# Patient Record
Sex: FEMALE | Race: WHITE | NOT HISPANIC OR LATINO | URBAN - METROPOLITAN AREA
[De-identification: names, ages, dates, MRNs, and addresses within clinical notes are randomized per-mention and may not be internally consistent; named-entity substitution may affect disease eponyms.]

---

## 2020-07-25 ENCOUNTER — TELEPHONE ENCOUNTER (OUTPATIENT)
Dept: URBAN - METROPOLITAN AREA CLINIC 13 | Facility: CLINIC | Age: 35
End: 2020-07-25

## 2020-07-25 RX ORDER — SODIUM SULFATE, POTASSIUM SULFATE, MAGNESIUM SULFATE 17.5; 3.13; 1.6 G/ML; G/ML; G/ML
DILUTE CONTENTS AND USE AS DIRECTED FOR BOWEL PREP SOLUTION, CONCENTRATE ORAL
Qty: 1 | Refills: 0 | OUTPATIENT
Start: 2019-10-23 | End: 2019-11-01

## 2020-07-26 ENCOUNTER — TELEPHONE ENCOUNTER (OUTPATIENT)
Dept: URBAN - METROPOLITAN AREA CLINIC 13 | Facility: CLINIC | Age: 35
End: 2020-07-26

## 2020-07-26 RX ORDER — ESCITALOPRAM 10 MG/1
TABLET, FILM COATED ORAL
Qty: 30 | Refills: 0 | Status: ACTIVE | COMMUNITY
Start: 2019-01-08

## 2020-07-26 RX ORDER — TOPIRAMATE 25 MG/1
TABLET, FILM COATED ORAL
Qty: 60 | Refills: 0 | Status: ACTIVE | COMMUNITY
Start: 2018-07-20

## 2020-07-26 RX ORDER — SUMATRIPTAN SUCCINATE 50 MG/1
TABLET, FILM COATED ORAL
Qty: 9 | Refills: 0 | Status: ACTIVE | COMMUNITY
Start: 2012-10-19

## 2020-07-26 RX ORDER — TOPIRAMATE 25 MG/1
TABLET, FILM COATED ORAL
Qty: 60 | Refills: 0 | Status: ACTIVE | COMMUNITY
Start: 2019-05-01

## 2020-07-26 RX ORDER — ESCITALOPRAM 5 MG/1
TABLET, FILM COATED ORAL
Qty: 30 | Refills: 0 | Status: ACTIVE | COMMUNITY
Start: 2019-01-08

## 2020-07-26 RX ORDER — TOPIRAMATE 100 MG/1
TABLET, FILM COATED ORAL
Qty: 180 | Refills: 0 | Status: ACTIVE | COMMUNITY
Start: 2012-10-12

## 2020-07-26 RX ORDER — TOPIRAMATE 50 MG/1
TAKE 2 TABLETS BY MOUTH TWICE A DAY TABLET, FILM COATED ORAL
Qty: 255 | Refills: 0 | Status: ACTIVE | COMMUNITY
Start: 2011-12-23

## 2020-07-26 RX ORDER — METRONIDAZOLE 500 MG/1
TABLET ORAL
Qty: 2 | Refills: 0 | Status: ACTIVE | COMMUNITY
Start: 2012-10-10

## 2020-07-26 RX ORDER — LEVOFLOXACIN 500 MG/1
TAKE 1 TABLET BY MOUTH EVERY DAY FOR 10 DAYS TABLET, FILM COATED ORAL
Qty: 10 | Refills: 0 | Status: ACTIVE | COMMUNITY
Start: 2012-03-20

## 2020-07-26 RX ORDER — ERYTHROMYCIN 500 MG/1
TABLET, COATED ORAL
Qty: 2 | Refills: 0 | Status: ACTIVE | COMMUNITY
Start: 2012-10-10

## 2020-07-26 RX ORDER — DULOXETINE HCL 20 MG
TAKE 1 CAP DAILY X7 DAYS, 2 CAPS DAILY X7 DAYS THEN 3 CAPS DAILY. THEN CAPSULE,DELAYED RELEASE (ENTERIC COATED) ORAL
Qty: 30 | Refills: 0 | Status: ACTIVE | COMMUNITY
Start: 2012-07-19

## 2020-07-26 RX ORDER — NORETHINDRONE ACETATE AND ETHINYL ESTRADIOL, AND FERROUS FUMARATE 1.5-30(21)
KIT ORAL
Qty: 28 | Refills: 0 | Status: ACTIVE | COMMUNITY
Start: 2012-06-29

## 2020-07-26 RX ORDER — CEPHALEXIN 500 MG/1
CAPSULE ORAL
Qty: 40 | Refills: 0 | Status: ACTIVE | COMMUNITY
Start: 2012-09-20

## 2020-07-26 RX ORDER — VERAPAMIL HYDROCHLORIDE 120 MG/1
TAKE 1 TABLET BY MOUTH EVERY DAY WITH FOOD TABLET, FILM COATED, EXTENDED RELEASE ORAL
Qty: 30 | Refills: 0 | Status: ACTIVE | COMMUNITY
Start: 2012-03-26

## 2020-07-26 RX ORDER — DICYCLOMINE HYDROCHLORIDE 10 MG/1
TAKE 1 CAPSULE 3 TIMES DAILY PRN CAPSULE ORAL
Qty: 40 | Refills: 1 | Status: ACTIVE | COMMUNITY
Start: 2019-11-12

## 2020-07-26 RX ORDER — ACETAMINOPHEN AND CODEINE PHOSPHATE 300; 30 MG/1; MG/1
TABLET ORAL
Qty: 30 | Refills: 0 | Status: ACTIVE | COMMUNITY
Start: 2012-10-12

## 2020-07-26 RX ORDER — BUPROPION HYDROCHLORIDE 100 MG/1
TAKE 1 TABLET TWICE DAILY TABLET, FILM COATED ORAL
Refills: 0 | Status: ACTIVE | COMMUNITY

## 2020-07-26 RX ORDER — BUPROPION HYDROCHLORIDE 100 MG/1
TABLET, EXTENDED RELEASE ORAL
Qty: 180 | Refills: 0 | Status: ACTIVE | COMMUNITY
Start: 2019-05-15

## 2021-03-09 ENCOUNTER — WEB ENCOUNTER (OUTPATIENT)
Dept: URBAN - METROPOLITAN AREA CLINIC 113 | Facility: CLINIC | Age: 36
End: 2021-03-09

## 2021-03-09 ENCOUNTER — OFFICE VISIT (OUTPATIENT)
Dept: URBAN - METROPOLITAN AREA CLINIC 113 | Facility: CLINIC | Age: 36
End: 2021-03-09
Payer: COMMERCIAL

## 2021-03-09 VITALS
RESPIRATION RATE: 18 BRPM | SYSTOLIC BLOOD PRESSURE: 126 MMHG | WEIGHT: 136 LBS | DIASTOLIC BLOOD PRESSURE: 72 MMHG | TEMPERATURE: 97.6 F | HEIGHT: 63 IN | BODY MASS INDEX: 24.1 KG/M2 | HEART RATE: 85 BPM

## 2021-03-09 DIAGNOSIS — Z86.010 HISTORY OF COLON POLYPS: ICD-10-CM

## 2021-03-09 DIAGNOSIS — K59.01 SLOW TRANSIT CONSTIPATION: ICD-10-CM

## 2021-03-09 DIAGNOSIS — R10.9 LEFT SIDED ABDOMINAL PAIN: ICD-10-CM

## 2021-03-09 PROCEDURE — 99213 OFFICE O/P EST LOW 20 MIN: CPT | Performed by: NURSE PRACTITIONER

## 2021-03-09 RX ORDER — SUMATRIPTAN SUCCINATE 50 MG/1
TABLET, FILM COATED ORAL
Qty: 9 | Refills: 0 | Status: ON HOLD | COMMUNITY
Start: 2012-10-19

## 2021-03-09 RX ORDER — CEPHALEXIN 500 MG/1
CAPSULE ORAL
Qty: 40 | Refills: 0 | Status: ON HOLD | COMMUNITY
Start: 2012-09-20

## 2021-03-09 RX ORDER — TOPIRAMATE 25 MG/1
TABLET, FILM COATED ORAL
Qty: 60 | Refills: 0 | Status: ON HOLD | COMMUNITY
Start: 2018-07-20

## 2021-03-09 RX ORDER — LEVOFLOXACIN 500 MG/1
TAKE 1 TABLET BY MOUTH EVERY DAY FOR 10 DAYS TABLET, FILM COATED ORAL
Qty: 10 | Refills: 0 | Status: ON HOLD | COMMUNITY
Start: 2012-03-20

## 2021-03-09 RX ORDER — BUPROPION HYDROCHLORIDE 100 MG/1
TABLET, EXTENDED RELEASE ORAL
Qty: 180 | Refills: 0 | Status: ON HOLD | COMMUNITY
Start: 2019-05-15

## 2021-03-09 RX ORDER — BUPROPION HYDROCHLORIDE 100 MG/1
TAKE 1 TABLET TWICE DAILY TABLET, FILM COATED ORAL
Refills: 0 | Status: ON HOLD | COMMUNITY

## 2021-03-09 RX ORDER — ESCITALOPRAM 10 MG/1
TABLET, FILM COATED ORAL
Qty: 30 | Refills: 0 | Status: ON HOLD | COMMUNITY
Start: 2019-01-08

## 2021-03-09 RX ORDER — DULOXETINE HCL 20 MG
TAKE 1 CAP DAILY X7 DAYS, 2 CAPS DAILY X7 DAYS THEN 3 CAPS DAILY. THEN CAPSULE,DELAYED RELEASE (ENTERIC COATED) ORAL
Qty: 30 | Refills: 0 | Status: ON HOLD | COMMUNITY
Start: 2012-07-19

## 2021-03-09 RX ORDER — TOPIRAMATE 100 MG/1
TABLET, FILM COATED ORAL
Qty: 180 | Refills: 0 | Status: ON HOLD | COMMUNITY
Start: 2012-10-12

## 2021-03-09 RX ORDER — TOPIRAMATE 50 MG/1
TAKE 2 TABLETS BY MOUTH TWICE A DAY TABLET, FILM COATED ORAL
Qty: 255 | Refills: 0 | Status: ON HOLD | COMMUNITY
Start: 2011-12-23

## 2021-03-09 RX ORDER — VERAPAMIL HYDROCHLORIDE 120 MG/1
TAKE 1 TABLET BY MOUTH EVERY DAY WITH FOOD TABLET, FILM COATED, EXTENDED RELEASE ORAL
Qty: 30 | Refills: 0 | Status: ON HOLD | COMMUNITY
Start: 2012-03-26

## 2021-03-09 RX ORDER — METRONIDAZOLE 500 MG/1
TABLET ORAL
Qty: 2 | Refills: 0 | Status: ON HOLD | COMMUNITY
Start: 2012-10-10

## 2021-03-09 RX ORDER — ERYTHROMYCIN 500 MG/1
TABLET, COATED ORAL
Qty: 2 | Refills: 0 | Status: ON HOLD | COMMUNITY
Start: 2012-10-10

## 2021-03-09 RX ORDER — ESCITALOPRAM 5 MG/1
TABLET, FILM COATED ORAL
Qty: 30 | Refills: 0 | Status: ON HOLD | COMMUNITY
Start: 2019-01-08

## 2021-03-09 RX ORDER — SODIUM, POTASSIUM,MAG SULFATES 17.5-3.13G
354 ML SOLUTION, RECONSTITUTED, ORAL ORAL ONCE
Qty: 354 MILLILITER | Refills: 0 | OUTPATIENT
Start: 2021-03-09 | End: 2021-03-10

## 2021-03-09 RX ORDER — ACETAMINOPHEN AND CODEINE PHOSPHATE 300; 30 MG/1; MG/1
TABLET ORAL
Qty: 30 | Refills: 0 | Status: ON HOLD | COMMUNITY
Start: 2012-10-12

## 2021-03-09 RX ORDER — LINACLOTIDE 72 UG/1
1 CAPSULE AT LEAST 30 MINUTES BEFORE THE FIRST MEAL OF THE DAY ON AN EMPTY STOMACH CAPSULE, GELATIN COATED ORAL ONCE A DAY
Qty: 90 CAPSULES | Refills: 3 | OUTPATIENT
Start: 2021-03-09 | End: 2022-03-04

## 2021-03-09 RX ORDER — DICYCLOMINE HYDROCHLORIDE 10 MG/1
TAKE 1 CAPSULE 3 TIMES DAILY PRN CAPSULE ORAL
Qty: 40 | Refills: 1 | Status: ON HOLD | COMMUNITY
Start: 2019-11-12

## 2021-03-09 RX ORDER — NORETHINDRONE ACETATE AND ETHINYL ESTRADIOL, AND FERROUS FUMARATE 1.5-30(21)
KIT ORAL
Qty: 28 | Refills: 0 | Status: ON HOLD | COMMUNITY
Start: 2012-06-29

## 2021-03-09 RX ORDER — HYOSCYAMINE SULFATE 0.12 MG/1
PLACE 1 TABLET UNDER THE TONGUE AND ALLOW TO DISSOLVE AS NEEDED FOR ABDOMINAL PAIN TABLET SUBLINGUAL
Qty: 45 | Refills: 1 | OUTPATIENT
Start: 2021-03-09 | End: 2021-03-29

## 2021-03-09 NOTE — HPI-TODAY'S VISIT:
34yo female with a history of colon polyps presenting for evaluation of left sided abodminal pain and a change in bowel habits.   She was last seen 11/12/19 for follow-up after colonoscopy, which was notable for the piecemeal removal of a 25mm sessile serrated polyp in the ascending colon. She was recommended a daily bowel regimen with MiraLAX and milk of magnesia for constipation, with plan for repeat colonoscopy in 6 months for surveillance.  She was unable to proceed with repeat colonoscopy due to financial barriers and the COVID-19 pandemic. She returns today with complaint of episodic left sided abdominal pain, bloating, constipation and stool urgency. She states she will go 3-4 days without a bowel movement, followed by "pebble-like" or soft stools. She complains of the frequent urge to defecate, without response. She has tried MiraLAX, without relief. She has occasional small volume red blood per rectum with wiping, which she attributes to hemorrhoids. She complains of intermittent left sided abdominal pain, which subsides with a bowel movement. Dicyclomine has not been helpful.

## 2021-04-06 ENCOUNTER — OFFICE VISIT (OUTPATIENT)
Dept: URBAN - METROPOLITAN AREA SURGERY CENTER 25 | Facility: SURGERY CENTER | Age: 36
End: 2021-04-06
Payer: COMMERCIAL

## 2021-04-06 DIAGNOSIS — Z86.010 H/O ADENOMATOUS POLYP OF COLON: ICD-10-CM

## 2021-04-06 PROCEDURE — G0105 COLORECTAL SCRN; HI RISK IND: HCPCS | Performed by: INTERNAL MEDICINE

## 2021-04-06 PROCEDURE — G8907 PT DOC NO EVENTS ON DISCHARG: HCPCS | Performed by: INTERNAL MEDICINE

## 2021-04-06 RX ORDER — DULOXETINE HCL 20 MG
TAKE 1 CAP DAILY X7 DAYS, 2 CAPS DAILY X7 DAYS THEN 3 CAPS DAILY. THEN CAPSULE,DELAYED RELEASE (ENTERIC COATED) ORAL
Qty: 30 | Refills: 0 | Status: ON HOLD | COMMUNITY
Start: 2012-07-19

## 2021-04-06 RX ORDER — SUMATRIPTAN SUCCINATE 50 MG/1
TABLET, FILM COATED ORAL
Qty: 9 | Refills: 0 | Status: ON HOLD | COMMUNITY
Start: 2012-10-19

## 2021-04-06 RX ORDER — BUPROPION HYDROCHLORIDE 100 MG/1
TAKE 1 TABLET TWICE DAILY TABLET, FILM COATED ORAL
Refills: 0 | Status: ON HOLD | COMMUNITY

## 2021-04-06 RX ORDER — ESCITALOPRAM 5 MG/1
TABLET, FILM COATED ORAL
Qty: 30 | Refills: 0 | Status: ON HOLD | COMMUNITY
Start: 2019-01-08

## 2021-04-06 RX ORDER — ERYTHROMYCIN 500 MG/1
TABLET, COATED ORAL
Qty: 2 | Refills: 0 | Status: ON HOLD | COMMUNITY
Start: 2012-10-10

## 2021-04-06 RX ORDER — LEVOFLOXACIN 500 MG/1
TAKE 1 TABLET BY MOUTH EVERY DAY FOR 10 DAYS TABLET, FILM COATED ORAL
Qty: 10 | Refills: 0 | Status: ON HOLD | COMMUNITY
Start: 2012-03-20

## 2021-04-06 RX ORDER — TOPIRAMATE 25 MG/1
TABLET, FILM COATED ORAL
Qty: 60 | Refills: 0 | Status: ON HOLD | COMMUNITY
Start: 2018-07-20

## 2021-04-06 RX ORDER — VERAPAMIL HYDROCHLORIDE 120 MG/1
TAKE 1 TABLET BY MOUTH EVERY DAY WITH FOOD TABLET, FILM COATED, EXTENDED RELEASE ORAL
Qty: 30 | Refills: 0 | Status: ON HOLD | COMMUNITY
Start: 2012-03-26

## 2021-04-06 RX ORDER — ACETAMINOPHEN AND CODEINE PHOSPHATE 300; 30 MG/1; MG/1
TABLET ORAL
Qty: 30 | Refills: 0 | Status: ON HOLD | COMMUNITY
Start: 2012-10-12

## 2021-04-06 RX ORDER — BUPROPION HYDROCHLORIDE 100 MG/1
TABLET, EXTENDED RELEASE ORAL
Qty: 180 | Refills: 0 | Status: ON HOLD | COMMUNITY
Start: 2019-05-15

## 2021-04-06 RX ORDER — TOPIRAMATE 100 MG/1
TABLET, FILM COATED ORAL
Qty: 180 | Refills: 0 | Status: ON HOLD | COMMUNITY
Start: 2012-10-12

## 2021-04-06 RX ORDER — CEPHALEXIN 500 MG/1
CAPSULE ORAL
Qty: 40 | Refills: 0 | Status: ON HOLD | COMMUNITY
Start: 2012-09-20

## 2021-04-06 RX ORDER — METRONIDAZOLE 500 MG/1
TABLET ORAL
Qty: 2 | Refills: 0 | Status: ON HOLD | COMMUNITY
Start: 2012-10-10

## 2021-04-06 RX ORDER — DICYCLOMINE HYDROCHLORIDE 10 MG/1
TAKE 1 CAPSULE 3 TIMES DAILY PRN CAPSULE ORAL
Qty: 40 | Refills: 1 | Status: ON HOLD | COMMUNITY
Start: 2019-11-12

## 2021-04-06 RX ORDER — ESCITALOPRAM 10 MG/1
TABLET, FILM COATED ORAL
Qty: 30 | Refills: 0 | Status: ON HOLD | COMMUNITY
Start: 2019-01-08

## 2021-04-06 RX ORDER — LINACLOTIDE 72 UG/1
1 CAPSULE AT LEAST 30 MINUTES BEFORE THE FIRST MEAL OF THE DAY ON AN EMPTY STOMACH CAPSULE, GELATIN COATED ORAL ONCE A DAY
Qty: 90 CAPSULES | Refills: 3 | Status: ACTIVE | COMMUNITY
Start: 2021-03-09 | End: 2022-03-04

## 2021-04-06 RX ORDER — NORETHINDRONE ACETATE AND ETHINYL ESTRADIOL, AND FERROUS FUMARATE 1.5-30(21)
KIT ORAL
Qty: 28 | Refills: 0 | Status: ON HOLD | COMMUNITY
Start: 2012-06-29

## 2021-04-06 RX ORDER — TOPIRAMATE 50 MG/1
TAKE 2 TABLETS BY MOUTH TWICE A DAY TABLET, FILM COATED ORAL
Qty: 255 | Refills: 0 | Status: ON HOLD | COMMUNITY
Start: 2011-12-23

## 2021-05-04 ENCOUNTER — OFFICE VISIT (OUTPATIENT)
Dept: URBAN - METROPOLITAN AREA CLINIC 113 | Facility: CLINIC | Age: 36
End: 2021-05-04

## 2021-06-29 ENCOUNTER — DASHBOARD ENCOUNTERS (OUTPATIENT)
Age: 36
End: 2021-06-29

## 2021-06-29 ENCOUNTER — WEB ENCOUNTER (OUTPATIENT)
Dept: URBAN - METROPOLITAN AREA CLINIC 113 | Facility: CLINIC | Age: 36
End: 2021-06-29

## 2021-06-29 ENCOUNTER — OFFICE VISIT (OUTPATIENT)
Dept: URBAN - METROPOLITAN AREA CLINIC 113 | Facility: CLINIC | Age: 36
End: 2021-06-29
Payer: COMMERCIAL

## 2021-06-29 ENCOUNTER — LAB OUTSIDE AN ENCOUNTER (OUTPATIENT)
Dept: URBAN - METROPOLITAN AREA CLINIC 113 | Facility: CLINIC | Age: 36
End: 2021-06-29

## 2021-06-29 VITALS
WEIGHT: 139 LBS | DIASTOLIC BLOOD PRESSURE: 75 MMHG | BODY MASS INDEX: 24.63 KG/M2 | TEMPERATURE: 98.2 F | RESPIRATION RATE: 18 BRPM | HEIGHT: 63 IN | SYSTOLIC BLOOD PRESSURE: 118 MMHG | HEART RATE: 80 BPM

## 2021-06-29 DIAGNOSIS — Z86.010 HISTORY OF COLON POLYPS: ICD-10-CM

## 2021-06-29 DIAGNOSIS — R13.10 ESOPHAGEAL DYSPHAGIA: ICD-10-CM

## 2021-06-29 DIAGNOSIS — R10.9 LEFT SIDED ABDOMINAL PAIN: ICD-10-CM

## 2021-06-29 DIAGNOSIS — N80.9 ENDOMETRIOSIS: ICD-10-CM

## 2021-06-29 DIAGNOSIS — K59.01 SLOW TRANSIT CONSTIPATION: ICD-10-CM

## 2021-06-29 PROBLEM — 129103003: Status: ACTIVE | Noted: 2021-06-29

## 2021-06-29 PROBLEM — 428283002 HISTORY OF POLYP OF COLON: Status: ACTIVE | Noted: 2021-03-09

## 2021-06-29 PROCEDURE — 99214 OFFICE O/P EST MOD 30 MIN: CPT | Performed by: INTERNAL MEDICINE

## 2021-06-29 RX ORDER — NORETHINDRONE ACETATE AND ETHINYL ESTRADIOL, AND FERROUS FUMARATE 1.5-30(21)
KIT ORAL
Qty: 28 | Refills: 0 | Status: ON HOLD | COMMUNITY
Start: 2012-06-29

## 2021-06-29 RX ORDER — ERYTHROMYCIN 500 MG/1
TABLET, COATED ORAL
Qty: 2 | Refills: 0 | Status: ON HOLD | COMMUNITY
Start: 2012-10-10

## 2021-06-29 RX ORDER — BUPROPION HYDROCHLORIDE 100 MG/1
TAKE 1 TABLET TWICE DAILY TABLET, FILM COATED ORAL
Refills: 0 | Status: ON HOLD | COMMUNITY

## 2021-06-29 RX ORDER — ESCITALOPRAM 5 MG/1
TABLET, FILM COATED ORAL
Qty: 30 | Refills: 0 | Status: ON HOLD | COMMUNITY
Start: 2019-01-08

## 2021-06-29 RX ORDER — TOPIRAMATE 25 MG/1
TABLET, FILM COATED ORAL
Qty: 60 | Refills: 0 | Status: ON HOLD | COMMUNITY
Start: 2018-07-20

## 2021-06-29 RX ORDER — LINACLOTIDE 72 UG/1
1 CAPSULE AT LEAST 30 MINUTES BEFORE THE FIRST MEAL OF THE DAY ON AN EMPTY STOMACH CAPSULE, GELATIN COATED ORAL ONCE A DAY
Qty: 90 CAPSULES | Refills: 3 | Status: ACTIVE | COMMUNITY
Start: 2021-03-09 | End: 2022-03-04

## 2021-06-29 RX ORDER — VERAPAMIL HYDROCHLORIDE 120 MG/1
TAKE 1 TABLET BY MOUTH EVERY DAY WITH FOOD TABLET, FILM COATED, EXTENDED RELEASE ORAL
Qty: 30 | Refills: 0 | Status: ON HOLD | COMMUNITY
Start: 2012-03-26

## 2021-06-29 RX ORDER — DULOXETINE HCL 20 MG
TAKE 1 CAP DAILY X7 DAYS, 2 CAPS DAILY X7 DAYS THEN 3 CAPS DAILY. THEN CAPSULE,DELAYED RELEASE (ENTERIC COATED) ORAL
Qty: 30 | Refills: 0 | Status: ON HOLD | COMMUNITY
Start: 2012-07-19

## 2021-06-29 RX ORDER — METRONIDAZOLE 500 MG/1
TABLET ORAL
Qty: 2 | Refills: 0 | Status: ON HOLD | COMMUNITY
Start: 2012-10-10

## 2021-06-29 RX ORDER — ACETAMINOPHEN AND CODEINE PHOSPHATE 300; 30 MG/1; MG/1
TABLET ORAL
Qty: 30 | Refills: 0 | Status: ON HOLD | COMMUNITY
Start: 2012-10-12

## 2021-06-29 RX ORDER — TOPIRAMATE 50 MG/1
TAKE 2 TABLETS BY MOUTH TWICE A DAY TABLET, FILM COATED ORAL
Qty: 255 | Refills: 0 | Status: ON HOLD | COMMUNITY
Start: 2011-12-23

## 2021-06-29 RX ORDER — ELAGOLIX 150 MG/1
1 TABLET TABLET, FILM COATED ORAL ONCE A DAY
Status: ACTIVE | COMMUNITY

## 2021-06-29 RX ORDER — CEPHALEXIN 500 MG/1
CAPSULE ORAL
Qty: 40 | Refills: 0 | Status: ON HOLD | COMMUNITY
Start: 2012-09-20

## 2021-06-29 RX ORDER — BUPROPION HYDROCHLORIDE 100 MG/1
TABLET, EXTENDED RELEASE ORAL
Qty: 180 | Refills: 0 | Status: ON HOLD | COMMUNITY
Start: 2019-05-15

## 2021-06-29 RX ORDER — ESCITALOPRAM 10 MG/1
TABLET, FILM COATED ORAL
Qty: 30 | Refills: 0 | Status: ON HOLD | COMMUNITY
Start: 2019-01-08

## 2021-06-29 RX ORDER — TOPIRAMATE 100 MG/1
TABLET, FILM COATED ORAL
Qty: 180 | Refills: 0 | Status: ON HOLD | COMMUNITY
Start: 2012-10-12

## 2021-06-29 RX ORDER — DICYCLOMINE HYDROCHLORIDE 10 MG/1
TAKE 1 CAPSULE 3 TIMES DAILY PRN CAPSULE ORAL
Qty: 40 | Refills: 1 | Status: ON HOLD | COMMUNITY
Start: 2019-11-12

## 2021-06-29 RX ORDER — SUMATRIPTAN SUCCINATE 50 MG/1
TABLET, FILM COATED ORAL
Qty: 9 | Refills: 0 | Status: ON HOLD | COMMUNITY
Start: 2012-10-19

## 2021-06-29 RX ORDER — LEVOFLOXACIN 500 MG/1
TAKE 1 TABLET BY MOUTH EVERY DAY FOR 10 DAYS TABLET, FILM COATED ORAL
Qty: 10 | Refills: 0 | Status: ON HOLD | COMMUNITY
Start: 2012-03-20

## 2021-06-29 NOTE — HPI-TODAY'S VISIT:
Patient returns today in follow-up and she has a history of slow transit constipation and some abdominal pain as well as endometriosis.  We gave her a trial of Levsin and Linzess.  She reports that Linzess has been very effective.  She is having regular bowel movements now.  She is quite pleased with the effect of the medication.  She denies any bleeding.  Levsin was ineffective.  Based on this she did follow through with OB/GYN and had a laparoscopy with endometrial ablation.  Apparently there was fairly extensive endometrial deposits.  She is now planning on hysterectomy.  Her colonoscopy was performed in April given a history of polyps.  Tattoo site looks good.  A 5-year interval was recommended and discussed with the patient today.  She does have a new complaint of swallowing difficulty.  This is been going on for several months.  She does not endorse any reflux.  She tells me she has had reflux in the past particularly when she was pregnant but this does not feel like that.  There is no precipitating factor.  No ameliorating factor.  Can occur with solids and liquids although does seem to be more associated with solids.

## 2021-07-02 PROBLEM — 35298007 SLOW TRANSIT CONSTIPATION: Status: ACTIVE | Noted: 2021-03-09

## 2021-07-02 PROBLEM — 40890009: Status: ACTIVE | Noted: 2021-06-29

## 2021-07-22 ENCOUNTER — OFFICE VISIT (OUTPATIENT)
Dept: URBAN - METROPOLITAN AREA SURGERY CENTER 25 | Facility: SURGERY CENTER | Age: 36
End: 2021-07-22
Payer: COMMERCIAL

## 2021-07-22 DIAGNOSIS — R13.10 DYSPHAGIA: ICD-10-CM

## 2021-07-22 PROCEDURE — G8907 PT DOC NO EVENTS ON DISCHARG: HCPCS | Performed by: INTERNAL MEDICINE

## 2021-07-22 PROCEDURE — 43235 EGD DIAGNOSTIC BRUSH WASH: CPT | Performed by: INTERNAL MEDICINE

## 2021-07-22 RX ORDER — NORETHINDRONE ACETATE AND ETHINYL ESTRADIOL, AND FERROUS FUMARATE 1.5-30(21)
KIT ORAL
Qty: 28 | Refills: 0 | Status: ON HOLD | COMMUNITY
Start: 2012-06-29

## 2021-07-22 RX ORDER — CEPHALEXIN 500 MG/1
CAPSULE ORAL
Qty: 40 | Refills: 0 | Status: ON HOLD | COMMUNITY
Start: 2012-09-20

## 2021-07-22 RX ORDER — ACETAMINOPHEN AND CODEINE PHOSPHATE 300; 30 MG/1; MG/1
TABLET ORAL
Qty: 30 | Refills: 0 | Status: ON HOLD | COMMUNITY
Start: 2012-10-12

## 2021-07-22 RX ORDER — DULOXETINE HCL 20 MG
TAKE 1 CAP DAILY X7 DAYS, 2 CAPS DAILY X7 DAYS THEN 3 CAPS DAILY. THEN CAPSULE,DELAYED RELEASE (ENTERIC COATED) ORAL
Qty: 30 | Refills: 0 | Status: ON HOLD | COMMUNITY
Start: 2012-07-19

## 2021-07-22 RX ORDER — SUMATRIPTAN SUCCINATE 50 MG/1
TABLET, FILM COATED ORAL
Qty: 9 | Refills: 0 | Status: ON HOLD | COMMUNITY
Start: 2012-10-19

## 2021-07-22 RX ORDER — ELAGOLIX 150 MG/1
1 TABLET TABLET, FILM COATED ORAL ONCE A DAY
Status: ACTIVE | COMMUNITY

## 2021-07-22 RX ORDER — LINACLOTIDE 72 UG/1
1 CAPSULE AT LEAST 30 MINUTES BEFORE THE FIRST MEAL OF THE DAY ON AN EMPTY STOMACH CAPSULE, GELATIN COATED ORAL ONCE A DAY
Qty: 90 CAPSULES | Refills: 3 | Status: ACTIVE | COMMUNITY
Start: 2021-03-09 | End: 2022-03-04

## 2021-07-22 RX ORDER — DICYCLOMINE HYDROCHLORIDE 10 MG/1
TAKE 1 CAPSULE 3 TIMES DAILY PRN CAPSULE ORAL
Qty: 40 | Refills: 1 | Status: ON HOLD | COMMUNITY
Start: 2019-11-12

## 2021-07-22 RX ORDER — BUPROPION HYDROCHLORIDE 100 MG/1
TAKE 1 TABLET TWICE DAILY TABLET, FILM COATED ORAL
Refills: 0 | Status: ON HOLD | COMMUNITY

## 2021-07-22 RX ORDER — BUPROPION HYDROCHLORIDE 100 MG/1
TABLET, EXTENDED RELEASE ORAL
Qty: 180 | Refills: 0 | Status: ON HOLD | COMMUNITY
Start: 2019-05-15

## 2021-07-22 RX ORDER — ESCITALOPRAM 10 MG/1
TABLET, FILM COATED ORAL
Qty: 30 | Refills: 0 | Status: ON HOLD | COMMUNITY
Start: 2019-01-08

## 2021-07-22 RX ORDER — VERAPAMIL HYDROCHLORIDE 120 MG/1
TAKE 1 TABLET BY MOUTH EVERY DAY WITH FOOD TABLET, FILM COATED, EXTENDED RELEASE ORAL
Qty: 30 | Refills: 0 | Status: ON HOLD | COMMUNITY
Start: 2012-03-26

## 2021-07-22 RX ORDER — LEVOFLOXACIN 500 MG/1
TAKE 1 TABLET BY MOUTH EVERY DAY FOR 10 DAYS TABLET, FILM COATED ORAL
Qty: 10 | Refills: 0 | Status: ON HOLD | COMMUNITY
Start: 2012-03-20

## 2021-07-22 RX ORDER — METRONIDAZOLE 500 MG/1
TABLET ORAL
Qty: 2 | Refills: 0 | Status: ON HOLD | COMMUNITY
Start: 2012-10-10

## 2021-07-22 RX ORDER — TOPIRAMATE 100 MG/1
TABLET, FILM COATED ORAL
Qty: 180 | Refills: 0 | Status: ON HOLD | COMMUNITY
Start: 2012-10-12

## 2021-07-22 RX ORDER — ESCITALOPRAM 5 MG/1
TABLET, FILM COATED ORAL
Qty: 30 | Refills: 0 | Status: ON HOLD | COMMUNITY
Start: 2019-01-08

## 2021-07-22 RX ORDER — TOPIRAMATE 50 MG/1
TAKE 2 TABLETS BY MOUTH TWICE A DAY TABLET, FILM COATED ORAL
Qty: 255 | Refills: 0 | Status: ON HOLD | COMMUNITY
Start: 2011-12-23

## 2021-07-22 RX ORDER — TOPIRAMATE 25 MG/1
TABLET, FILM COATED ORAL
Qty: 60 | Refills: 0 | Status: ON HOLD | COMMUNITY
Start: 2018-07-20

## 2021-07-22 RX ORDER — ERYTHROMYCIN 500 MG/1
TABLET, COATED ORAL
Qty: 2 | Refills: 0 | Status: ON HOLD | COMMUNITY
Start: 2012-10-10

## 2021-09-02 ENCOUNTER — OFFICE VISIT (OUTPATIENT)
Dept: URBAN - METROPOLITAN AREA CLINIC 113 | Facility: CLINIC | Age: 36
End: 2021-09-02